# Patient Record
Sex: FEMALE | Race: WHITE | NOT HISPANIC OR LATINO | URBAN - NONMETROPOLITAN AREA
[De-identification: names, ages, dates, MRNs, and addresses within clinical notes are randomized per-mention and may not be internally consistent; named-entity substitution may affect disease eponyms.]

---

## 2019-11-11 ENCOUNTER — IMPORTED ENCOUNTER (OUTPATIENT)
Dept: URBAN - NONMETROPOLITAN AREA CLINIC 1 | Facility: CLINIC | Age: 74
End: 2019-11-11

## 2019-11-11 PROBLEM — Z96.1: Noted: 2019-11-11

## 2019-11-11 PROBLEM — H35.61: Noted: 2019-11-11

## 2019-11-11 PROBLEM — H26.493: Noted: 2019-11-11

## 2019-11-11 PROBLEM — H40.013: Noted: 2019-11-11

## 2019-11-11 PROCEDURE — 92014 COMPRE OPH EXAM EST PT 1/>: CPT

## 2019-11-11 NOTE — PATIENT DISCUSSION
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  Continue without drops at this time-  Continue to monitor with exams and testing.-  monitor yearly or prn; 's Notes: [address] - 10/24/17Lyn BETANCOURT- 10/18/16

## 2020-06-22 ENCOUNTER — IMPORTED ENCOUNTER (OUTPATIENT)
Dept: URBAN - NONMETROPOLITAN AREA CLINIC 1 | Facility: CLINIC | Age: 75
End: 2020-06-22

## 2020-06-22 PROBLEM — H10.423: Noted: 2020-06-22

## 2020-06-22 PROBLEM — Z96.1: Noted: 2019-11-11

## 2020-06-22 PROBLEM — H26.493: Noted: 2019-11-11

## 2020-06-22 PROBLEM — H40.013: Noted: 2019-11-11

## 2020-06-22 PROCEDURE — 99213 OFFICE O/P EST LOW 20 MIN: CPT

## 2020-06-22 NOTE — PATIENT DISCUSSION
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  Continue without drops at this time-  Continue to monitor with exams and testing.-  monitor 6 month Complete w/OCT [facility] and [facility]; 's Notes: [facility] OCT - 10/24/17Pach - SERAFIN- 10/18/16

## 2020-12-14 ENCOUNTER — IMPORTED ENCOUNTER (OUTPATIENT)
Dept: URBAN - NONMETROPOLITAN AREA CLINIC 1 | Facility: CLINIC | Age: 75
End: 2020-12-14

## 2020-12-14 PROCEDURE — 76514 ECHO EXAM OF EYE THICKNESS: CPT

## 2020-12-14 PROCEDURE — 92133 CPTRZD OPH DX IMG PST SGM ON: CPT

## 2020-12-14 PROCEDURE — 66821 AFTER CATARACT LASER SURGERY: CPT

## 2020-12-14 PROCEDURE — 99214 OFFICE O/P EST MOD 30 MIN: CPT

## 2020-12-14 NOTE — PATIENT DISCUSSION
PCO-Explained PCO and RBAs of YAG Capsulotomy to pt. -Pt elects to proceed. YAG Caps OD today  written consent signed and obtained -Monitor PCO OS for now. Glaucoma Suspect-  discussed findings w/patient -  IOP stable 18 OU stable -  [facility] OCT done today stable with normal rnfl OU -  C/D's 0.D 0.4 and 0.3 OS -  Appears stable at this time. - PAChy done today stable OD: 566 OS: 570 -ok to continue to monitor as a suspect and without gtts for now. -continue to monitor Presbyopia MR done and discussed refractive status with patient. Recommend updated MR @ [facility] s/p YAG PC OD @ next visit.  Ocular Allergies OU /Chronic Conjunctivitis -  discussed findings w/patient-  start Pataday QD OU for itching -  continue Refresh PRN -  monitor as scheduled or prn; Dr's Notes: [address] - 10/24/17Lyn BETANCOURT- 10/18/16

## 2020-12-15 PROBLEM — H26.491: Noted: 2021-01-13

## 2020-12-15 PROBLEM — H40.013: Noted: 2020-12-14

## 2020-12-15 PROBLEM — H10.423: Noted: 2020-12-14

## 2020-12-15 PROBLEM — H52.4: Noted: 2020-12-15

## 2020-12-15 PROBLEM — Z96.1: Noted: 2020-12-14

## 2020-12-28 ENCOUNTER — IMPORTED ENCOUNTER (OUTPATIENT)
Dept: URBAN - NONMETROPOLITAN AREA CLINIC 1 | Facility: CLINIC | Age: 75
End: 2020-12-28

## 2020-12-28 PROBLEM — Z96.1: Noted: 2020-12-28

## 2020-12-28 PROBLEM — H40.013: Noted: 2020-12-28

## 2020-12-28 PROCEDURE — 99024 POSTOP FOLLOW-UP VISIT: CPT

## 2020-12-28 NOTE — PATIENT DISCUSSION
s/p YAG PC ODStable on todays exam with open capsule No treatment needed continue to monitor Glaucoma Suspect-  discussed findings w/patient -  IOP stable today 17 OU -  [facility] OCT done previous stable with normal rnfl OU -  C/D's 0.D 0.4 and 0.3 OS -  Appears stable at this time. - PAChy done previous stable OD: 566 OS: 570 -ok to continue to monitor as a suspect and without gtts for now. -continue to monitor Presbyopia MR done and discussed refractive status with patient. Recommend updated MR @ POV s/p YAG PC OD @ next visit.  Ocular Allergies OU /Chronic Conjunctivitis -  discussed findings w/patient-  start Pataday QD OU for itching -  continue Refresh PRN -  monitor as scheduled or prn; 's Notes: [address] - 10/24/17Lyn BETANCOURT- 10/18/16

## 2022-04-09 ASSESSMENT — TONOMETRY
OD_IOP_MMHG: 18
OS_IOP_MMHG: 17
OS_IOP_MMHG: 18
OS_IOP_MMHG: 17
OS_IOP_MMHG: 17
OD_IOP_MMHG: 17

## 2022-04-09 ASSESSMENT — VISUAL ACUITY
OD_CC: 20/20
OD_GLARE: 20/25
OS_CC: 20/20
OS_SC: J1
OD_CC: 20/25-3
OD_GLARE: 20/60-2
OS_CC: 20/20
OS_CC: 20/20
OD_SC: J1
OS_CC: 20/25+2
OS_GLARE: 20/50-1
OD_CC: 20/25
OD_CC: 20/20
OD_CC: 20/20
OS_CC: 20/20
OS_GLARE: 20/25

## 2022-04-09 ASSESSMENT — PACHYMETRY
OS_CT_UM: 570; ADJ: WNL
OD_CT_UM: 572; ADJ: WNL
OD_CT_UM: 566; ADJ: WNL
OS_CT_UM: 575; ADJ: THICK
OD_CT_UM: 572; ADJ: WNL
OS_CT_UM: 575; ADJ: THICK

## 2022-07-20 ENCOUNTER — ESTABLISHED PATIENT (OUTPATIENT)
Dept: RURAL CLINIC 2 | Facility: CLINIC | Age: 77
End: 2022-07-20

## 2022-07-20 DIAGNOSIS — H26.492: ICD-10-CM

## 2022-07-20 DIAGNOSIS — H43.813: ICD-10-CM

## 2022-07-20 DIAGNOSIS — H16.223: ICD-10-CM

## 2022-07-20 DIAGNOSIS — H40.013: ICD-10-CM

## 2022-07-20 DIAGNOSIS — Z96.1: ICD-10-CM

## 2022-07-20 DIAGNOSIS — H52.4: ICD-10-CM

## 2022-07-20 PROCEDURE — 92014 COMPRE OPH EXAM EST PT 1/>: CPT

## 2022-07-20 PROCEDURE — 92015 DETERMINE REFRACTIVE STATE: CPT

## 2022-07-20 PROCEDURE — 92133 CPTRZD OPH DX IMG PST SGM ON: CPT

## 2022-07-20 ASSESSMENT — TONOMETRY
OS_IOP_MMHG: 19
OD_IOP_MMHG: 19

## 2022-07-20 ASSESSMENT — VISUAL ACUITY
OS_SC: 20/20
OD_SC: 20/20

## 2023-07-19 ENCOUNTER — ESTABLISHED PATIENT (OUTPATIENT)
Dept: RURAL CLINIC 2 | Facility: CLINIC | Age: 78
End: 2023-07-19

## 2023-07-19 DIAGNOSIS — Z96.1: ICD-10-CM

## 2023-07-19 DIAGNOSIS — H52.4: ICD-10-CM

## 2023-07-19 DIAGNOSIS — H16.223: ICD-10-CM

## 2023-07-19 DIAGNOSIS — H43.813: ICD-10-CM

## 2023-07-19 DIAGNOSIS — H26.492: ICD-10-CM

## 2023-07-19 DIAGNOSIS — H40.013: ICD-10-CM

## 2023-07-19 PROCEDURE — 92014 COMPRE OPH EXAM EST PT 1/>: CPT

## 2023-07-19 PROCEDURE — 92015 DETERMINE REFRACTIVE STATE: CPT

## 2023-07-19 PROCEDURE — 92133 CPTRZD OPH DX IMG PST SGM ON: CPT

## 2023-07-19 ASSESSMENT — TONOMETRY
OS_IOP_MMHG: 18
OD_IOP_MMHG: 18

## 2023-07-19 ASSESSMENT — VISUAL ACUITY
OS_SC: 20/20
OD_SC: 20/20